# Patient Record
Sex: FEMALE | Race: WHITE | URBAN - METROPOLITAN AREA
[De-identification: names, ages, dates, MRNs, and addresses within clinical notes are randomized per-mention and may not be internally consistent; named-entity substitution may affect disease eponyms.]

---

## 2024-01-18 ENCOUNTER — HOME CARE VISIT (OUTPATIENT)
Age: 79
End: 2024-01-18

## 2024-01-19 ENCOUNTER — HOME CARE VISIT (OUTPATIENT)
Age: 79
End: 2024-01-19

## 2024-01-22 ENCOUNTER — HOME CARE VISIT (OUTPATIENT)
Age: 79
End: 2024-01-22

## 2024-01-22 NOTE — HOSPICE
Non-admit. Spoke with pt's daughter; pt started on [facility] last week.